# Patient Record
Sex: MALE | Race: WHITE
[De-identification: names, ages, dates, MRNs, and addresses within clinical notes are randomized per-mention and may not be internally consistent; named-entity substitution may affect disease eponyms.]

---

## 2018-01-24 ENCOUNTER — HOSPITAL ENCOUNTER (OUTPATIENT)
Dept: HOSPITAL 26 - MED | Age: 23
Setting detail: OBSERVATION
LOS: 1 days | Discharge: HOME | End: 2018-01-25
Attending: FAMILY MEDICINE | Admitting: FAMILY MEDICINE
Payer: MEDICAID

## 2018-01-24 VITALS — SYSTOLIC BLOOD PRESSURE: 148 MMHG | DIASTOLIC BLOOD PRESSURE: 96 MMHG

## 2018-01-24 VITALS — SYSTOLIC BLOOD PRESSURE: 124 MMHG | DIASTOLIC BLOOD PRESSURE: 76 MMHG

## 2018-01-24 VITALS — DIASTOLIC BLOOD PRESSURE: 72 MMHG | SYSTOLIC BLOOD PRESSURE: 130 MMHG

## 2018-01-24 VITALS — SYSTOLIC BLOOD PRESSURE: 122 MMHG | DIASTOLIC BLOOD PRESSURE: 83 MMHG

## 2018-01-24 VITALS — BODY MASS INDEX: 33.5 KG/M2 | HEIGHT: 71 IN | WEIGHT: 239.31 LBS

## 2018-01-24 VITALS — SYSTOLIC BLOOD PRESSURE: 129 MMHG | DIASTOLIC BLOOD PRESSURE: 66 MMHG

## 2018-01-24 DIAGNOSIS — I88.0: ICD-10-CM

## 2018-01-24 DIAGNOSIS — D72.829: ICD-10-CM

## 2018-01-24 DIAGNOSIS — T40.7X5A: ICD-10-CM

## 2018-01-24 DIAGNOSIS — E83.41: ICD-10-CM

## 2018-01-24 DIAGNOSIS — E78.5: ICD-10-CM

## 2018-01-24 DIAGNOSIS — F17.210: ICD-10-CM

## 2018-01-24 DIAGNOSIS — G92: ICD-10-CM

## 2018-01-24 DIAGNOSIS — M54.2: ICD-10-CM

## 2018-01-24 DIAGNOSIS — E66.9: ICD-10-CM

## 2018-01-24 DIAGNOSIS — R80.9: ICD-10-CM

## 2018-01-24 DIAGNOSIS — M54.5: Primary | ICD-10-CM

## 2018-01-24 DIAGNOSIS — T43.625A: ICD-10-CM

## 2018-01-24 LAB
ALBUMIN FLD-MCNC: 4.1 G/DL (ref 3.4–5)
AMYLASE SERPL-CCNC: 61 U/L (ref 25–115)
ANION GAP SERPL CALCULATED.3IONS-SCNC: 16.5 MMOL/L (ref 8–16)
APPEARANCE UR: CLEAR
AST SERPL-CCNC: 26 U/L (ref 15–37)
BARBITURATES UR QL SCN: (no result) NG/ML
BASOPHILS # BLD AUTO: 0.2 K/UL (ref 0–0.22)
BASOPHILS NFR BLD AUTO: 2.4 % (ref 0–2)
BENZODIAZ UR QL SCN: (no result) NG/ML
BILIRUB SERPL-MCNC: 0.5 MG/DL (ref 0–1)
BILIRUB UR QL STRIP: NEGATIVE
BUN SERPL-MCNC: 14 MG/DL (ref 7–18)
BZE UR QL SCN: (no result) NG/ML
CANNABINOIDS UR QL SCN: (no result) NG/ML
CHLORIDE SERPL-SCNC: 104 MMOL/L (ref 98–107)
CHOLEST/HDLC SERPL: 4.8 {RATIO} (ref 1–4.5)
CO2 SERPL-SCNC: 27.4 MMOL/L (ref 21–32)
COLOR UR: YELLOW
CREAT SERPL-MCNC: 1.1 MG/DL (ref 0.7–1.3)
EOSINOPHIL # BLD AUTO: 0.1 K/UL (ref 0–0.4)
EOSINOPHIL NFR BLD AUTO: 0.6 % (ref 0–4)
ERYTHROCYTE [DISTWIDTH] IN BLOOD BY AUTOMATED COUNT: 12.7 % (ref 11.6–13.7)
ERYTHROCYTE [DISTWIDTH] IN BLOOD BY AUTOMATED COUNT: 13.1 % (ref 11.6–13.7)
GFR SERPL CREATININE-BSD FRML MDRD: 110 ML/MIN (ref 90–?)
GLUCOSE SERPL-MCNC: 107 MG/DL (ref 74–106)
GLUCOSE UR STRIP-MCNC: NEGATIVE MG/DL
HCT VFR BLD AUTO: 41.4 % (ref 36–52)
HCT VFR BLD AUTO: 47.2 % (ref 36–52)
HDLC SERPL-MCNC: 51 MG/DL (ref 40–60)
HGB BLD-MCNC: 13.9 G/DL (ref 12–18)
HGB BLD-MCNC: 15.8 G/DL (ref 12–18)
HGB UR QL STRIP: NEGATIVE
LDLC SERPL CALC-MCNC: 173 MG/DL (ref 60–100)
LEUKOCYTE ESTERASE UR QL STRIP: NEGATIVE
LIPASE SERPL-CCNC: 172 U/L (ref 73–393)
LYMPHOCYTES # BLD AUTO: 2.5 K/UL (ref 2–11.5)
LYMPHOCYTES NFR BLD AUTO: 26 % (ref 20.5–51.1)
LYMPHOCYTES NFR BLD MANUAL: 15 % (ref 20–46)
MAGNESIUM SERPL-MCNC: 2.5 MG/DL (ref 1.8–2.4)
MCH RBC QN AUTO: 27 PG (ref 27–31)
MCH RBC QN AUTO: 28 PG (ref 27–31)
MCHC RBC AUTO-ENTMCNC: 33 G/DL (ref 33–37)
MCHC RBC AUTO-ENTMCNC: 34 G/DL (ref 33–37)
MCV RBC AUTO: 82 FL (ref 80–94)
MCV RBC AUTO: 82 FL (ref 80–94)
MONOCYTES # BLD AUTO: 0.7 K/UL (ref 0.8–1)
MONOCYTES NFR BLD AUTO: 6.8 % (ref 1.7–9.3)
MONOCYTES NFR BLD MANUAL: 2 % (ref 5–12)
NEUTROPHILS # BLD AUTO: 6.2 K/UL (ref 1.8–7.7)
NEUTROPHILS NFR BLD AUTO: 64.2 % (ref 42.2–75.2)
NITRITE UR QL STRIP: NEGATIVE
OPIATES UR QL SCN: (no result) NG/ML
PCP UR QL SCN: (no result) NG/ML
PH UR STRIP: 6 [PH] (ref 5–9)
PHOSPHATE SERPL-MCNC: 3.3 MG/DL (ref 2.5–4.9)
PLATELET # BLD AUTO: 247 K/UL (ref 140–450)
PLATELET # BLD AUTO: 289 K/UL (ref 140–450)
POTASSIUM SERPL-SCNC: 3.9 MMOL/L (ref 3.5–5.1)
PROTHROMBIN TIME: 10.3 SECS (ref 10.8–13.4)
RBC # BLD AUTO: 5.03 MIL/UL (ref 4.2–6.1)
RBC # BLD AUTO: 5.76 MIL/UL (ref 4.2–6.1)
RBC #/AREA URNS HPF: (no result) /HPF (ref 0–5)
SODIUM SERPL-SCNC: 144 MMOL/L (ref 136–145)
T4 FREE SERPL-MCNC: 1.14 NG/DL (ref 0.76–1.46)
TRIGL SERPL-MCNC: 107 MG/DL (ref 30–150)
TSH SERPL DL<=0.05 MIU/L-ACNC: 4.1 UIU/ML (ref 0.34–3.74)
WBC # BLD AUTO: 18.4 K/UL (ref 4.5–11)
WBC # BLD AUTO: 9.8 K/UL (ref 4.5–11)
WBC,URINE: (no result) /HPF (ref 0–5)

## 2018-01-24 PROCEDURE — 83036 HEMOGLOBIN GLYCOSYLATED A1C: CPT

## 2018-01-24 PROCEDURE — 84443 ASSAY THYROID STIM HORMONE: CPT

## 2018-01-24 PROCEDURE — 99285 EMERGENCY DEPT VISIT HI MDM: CPT

## 2018-01-24 PROCEDURE — 82150 ASSAY OF AMYLASE: CPT

## 2018-01-24 PROCEDURE — 72125 CT NECK SPINE W/O DYE: CPT

## 2018-01-24 PROCEDURE — 80053 COMPREHEN METABOLIC PANEL: CPT

## 2018-01-24 PROCEDURE — 83735 ASSAY OF MAGNESIUM: CPT

## 2018-01-24 PROCEDURE — 86900 BLOOD TYPING SEROLOGIC ABO: CPT

## 2018-01-24 PROCEDURE — 84436 ASSAY OF TOTAL THYROXINE: CPT

## 2018-01-24 PROCEDURE — 36415 COLL VENOUS BLD VENIPUNCTURE: CPT

## 2018-01-24 PROCEDURE — G0482 DRUG TEST DEF 15-21 CLASSES: HCPCS

## 2018-01-24 PROCEDURE — 93005 ELECTROCARDIOGRAM TRACING: CPT

## 2018-01-24 PROCEDURE — 96376 TX/PRO/DX INJ SAME DRUG ADON: CPT

## 2018-01-24 PROCEDURE — 70486 CT MAXILLOFACIAL W/O DYE: CPT

## 2018-01-24 PROCEDURE — 96366 THER/PROPH/DIAG IV INF ADDON: CPT

## 2018-01-24 PROCEDURE — 86886 COOMBS TEST INDIRECT TITER: CPT

## 2018-01-24 PROCEDURE — 84484 ASSAY OF TROPONIN QUANT: CPT

## 2018-01-24 PROCEDURE — 85730 THROMBOPLASTIN TIME PARTIAL: CPT

## 2018-01-24 PROCEDURE — 86901 BLOOD TYPING SEROLOGIC RH(D): CPT

## 2018-01-24 PROCEDURE — 85025 COMPLETE CBC W/AUTO DIFF WBC: CPT

## 2018-01-24 PROCEDURE — 84100 ASSAY OF PHOSPHORUS: CPT

## 2018-01-24 PROCEDURE — 82140 ASSAY OF AMMONIA: CPT

## 2018-01-24 PROCEDURE — 96367 TX/PROPH/DG ADDL SEQ IV INF: CPT

## 2018-01-24 PROCEDURE — 87081 CULTURE SCREEN ONLY: CPT

## 2018-01-24 PROCEDURE — 84439 ASSAY OF FREE THYROXINE: CPT

## 2018-01-24 PROCEDURE — 72131 CT LUMBAR SPINE W/O DYE: CPT

## 2018-01-24 PROCEDURE — 96361 HYDRATE IV INFUSION ADD-ON: CPT

## 2018-01-24 PROCEDURE — 96365 THER/PROPH/DIAG IV INF INIT: CPT

## 2018-01-24 PROCEDURE — 74176 CT ABD & PELVIS W/O CONTRAST: CPT

## 2018-01-24 PROCEDURE — 71250 CT THORAX DX C-: CPT

## 2018-01-24 PROCEDURE — 70450 CT HEAD/BRAIN W/O DYE: CPT

## 2018-01-24 PROCEDURE — 80061 LIPID PANEL: CPT

## 2018-01-24 PROCEDURE — 73564 X-RAY EXAM KNEE 4 OR MORE: CPT

## 2018-01-24 PROCEDURE — 72128 CT CHEST SPINE W/O DYE: CPT

## 2018-01-24 PROCEDURE — 85610 PROTHROMBIN TIME: CPT

## 2018-01-24 PROCEDURE — 83690 ASSAY OF LIPASE: CPT

## 2018-01-24 PROCEDURE — 81001 URINALYSIS AUTO W/SCOPE: CPT

## 2018-01-24 PROCEDURE — 84479 ASSAY OF THYROID (T3 OR T4): CPT

## 2018-01-24 PROCEDURE — 96375 TX/PRO/DX INJ NEW DRUG ADDON: CPT

## 2018-01-24 PROCEDURE — 80048 BASIC METABOLIC PNL TOTAL CA: CPT

## 2018-01-24 PROCEDURE — G0378 HOSPITAL OBSERVATION PER HR: HCPCS

## 2018-01-24 PROCEDURE — 80305 DRUG TEST PRSMV DIR OPT OBS: CPT

## 2018-01-24 PROCEDURE — 83880 ASSAY OF NATRIURETIC PEPTIDE: CPT

## 2018-01-24 PROCEDURE — 76705 ECHO EXAM OF ABDOMEN: CPT

## 2018-01-24 RX ADMIN — SODIUM CHLORIDE SCH MLS/HR: 9 INJECTION, SOLUTION INTRAVENOUS at 20:41

## 2018-01-24 RX ADMIN — METRONIDAZOLE SCH MLS/HR: 500 SOLUTION INTRAVENOUS at 13:08

## 2018-01-24 RX ADMIN — METRONIDAZOLE SCH MLS/HR: 500 SOLUTION INTRAVENOUS at 20:41

## 2018-01-24 RX ADMIN — SODIUM CHLORIDE SCH MLS/HR: 9 INJECTION, SOLUTION INTRAVENOUS at 10:06

## 2018-01-24 NOTE — NUR
PATIENT IS SLEEPING, RESPIRATION EVEN AND UNLABORED, NO S/S OF DISTRESS NOTED, CALL LIGHT 
WITHIN REACH, SAFETY MEASURE ENSURED, WILL CONTINUE TO MONITOR.

## 2018-01-24 NOTE — NUR
PT ARRIVE VIA GURNEY APPEARS ASLEEP BUT AROUSABLE. PD AT BEDSIDE AND PT IS HANDCUFFED. VITAL 
SIGNS CHECKED. T=96.3 HR 69 100% ROOMAIR /78 R 16.

## 2018-01-24 NOTE — NUR
RECEIVED REPORT FROM NIGHT NURSE AT PT BEDSIDE. PT IS FOUND ASLEEP, MINIMAL AROUSAL WITH 
MODERATE STIMULI. PD AT BEDSIDE FOR PREBOOK. PATIENT CALL LIGHT WITHIN REACH. UNABLE TO 
OBTAIN H&P. WILL CONTINUE TO MONITOR.

## 2018-01-24 NOTE — NUR
RECEIVED REPORT FROM DAY SHIFT RN, PATIENT WAS SLEEPING, EASY TO AROUSE, BUT PATIENT REFUSED 
TO TALK AND REFUSED SKIN ASSESSMENT. NO S/S OF DISTRESS NOTED, RESPIRATION EVEN AND 
UNLABORED, IV PATENT AND INTACT, INFUSING NS AT 80ML/HR, CALL LIGHT WITHIN REACH, SAFETY 
MEASURE ENSURED, WILL CONTINUE TO MONITOR.
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
## 2018-01-24 NOTE — NUR
PATIENT HAD US AT BEDSIDE. PATIENT ASLEEP, AWAKENS TO MODERATE STIMULI. NO S/S OF ACUTE 
DISTRESS. INDEPENDENT IN ADLS.

## 2018-01-24 NOTE — NUR
Patient will be admitted to care of DR MARCUM. Admited to TELE 112A.  Will 
go to room 112A. Belongings list completed.  Report to EDWIN FARAH .

## 2018-01-24 NOTE — NUR
PATIENT IS AWAKE AND ALERT AT THIS TIME. DENIES PAIN. NO S/S OF RESPIRATORY DISTRESS. 
FOLLOWS COMMANDS. ANSWERS QUESTIONS APPROPRIATELY. MADE AWARE OF HOSPITAL ENVIRONMENT. CALL 
LIGHT WITHIN REACH. IV SITE PATENT AND INTACT.

## 2018-01-24 NOTE — NUR
PATIENT'S UNCLE AT BEDSIDE. PATIENT AND FAMILY SPOKE WITH DR. GAMING AT BEDSIDE. NEW ORDERS 
RECEIVED, MEDICATED FOR PAIN AND NAUSEA. STARTED ON IVABX. NO S/S OF ACUTE DISTRESS, RESTING 
IN BED. ANSWERS QUESTIONS APPROPRIATELY. PATIENT IS NO LONGER BEING HELD FOR BOOKING.

## 2018-01-24 NOTE — NUR
PATIENT HAS BEEN SCREENED AND CATEGORIZED AS LOW NUTRITION RISK. PATIENT WILL BE SEEN WITHIN 
7 DAYS OF ADMISSION.



1/30/18



JULIETH TRACY RD

## 2018-01-25 VITALS — DIASTOLIC BLOOD PRESSURE: 73 MMHG | SYSTOLIC BLOOD PRESSURE: 115 MMHG

## 2018-01-25 LAB
ANION GAP SERPL CALCULATED.3IONS-SCNC: 12 MMOL/L (ref 8–16)
BASOPHILS # BLD AUTO: 0.4 K/UL (ref 0–0.22)
BASOPHILS NFR BLD AUTO: 4.8 % (ref 0–2)
BUN SERPL-MCNC: 11 MG/DL (ref 7–18)
CHLORIDE SERPL-SCNC: 107 MMOL/L (ref 98–107)
CO2 SERPL-SCNC: 27.9 MMOL/L (ref 21–32)
CREAT SERPL-MCNC: 0.8 MG/DL (ref 0.7–1.3)
EOSINOPHIL # BLD AUTO: 0.1 K/UL (ref 0–0.4)
EOSINOPHIL NFR BLD AUTO: 1 % (ref 0–4)
ERYTHROCYTE [DISTWIDTH] IN BLOOD BY AUTOMATED COUNT: 12.8 % (ref 11.6–13.7)
GFR SERPL CREATININE-BSD FRML MDRD: 158 ML/MIN (ref 90–?)
GLUCOSE SERPL-MCNC: 95 MG/DL (ref 74–106)
HCT VFR BLD AUTO: 41.7 % (ref 36–52)
HGB BLD-MCNC: 14 G/DL (ref 12–18)
LYMPHOCYTES # BLD AUTO: 2.4 K/UL (ref 2–11.5)
LYMPHOCYTES NFR BLD AUTO: 26.9 % (ref 20.5–51.1)
MAGNESIUM SERPL-MCNC: 1.8 MG/DL (ref 1.8–2.4)
MCH RBC QN AUTO: 28 PG (ref 27–31)
MCHC RBC AUTO-ENTMCNC: 34 G/DL (ref 33–37)
MCV RBC AUTO: 82 FL (ref 80–94)
MONOCYTES # BLD AUTO: 0.8 K/UL (ref 0.8–1)
MONOCYTES NFR BLD AUTO: 8.5 % (ref 1.7–9.3)
NEUTROPHILS # BLD AUTO: 5.4 K/UL (ref 1.8–7.7)
NEUTROPHILS NFR BLD AUTO: 58.8 % (ref 42.2–75.2)
PHOSPHATE SERPL-MCNC: 3.7 MG/DL (ref 2.5–4.9)
PLATELET # BLD AUTO: 245 K/UL (ref 140–450)
POTASSIUM SERPL-SCNC: 3.9 MMOL/L (ref 3.5–5.1)
RBC # BLD AUTO: 5.06 MIL/UL (ref 4.2–6.1)
SODIUM SERPL-SCNC: 143 MMOL/L (ref 136–145)
WBC # BLD AUTO: 9.1 K/UL (ref 4.5–11)

## 2018-01-25 RX ADMIN — LACTULOSE SCH GM: 10 SOLUTION ORAL at 09:29

## 2018-01-25 RX ADMIN — METRONIDAZOLE SCH MLS/HR: 500 SOLUTION INTRAVENOUS at 05:11

## 2018-01-25 RX ADMIN — LACTULOSE SCH GM: 10 SOLUTION ORAL at 09:00

## 2018-01-25 RX ADMIN — SODIUM CHLORIDE SCH MLS/HR: 9 INJECTION, SOLUTION INTRAVENOUS at 09:30

## 2018-01-25 NOTE — NUR
NO CHANGE IN CONDITION, PATIENT IS SLEEPING, NO S/S OF DISTRESS NOTED, RESPIRATION EVEN AND 
UNLABORED, CALL LIGHT WITHIN REACH, SAFETY MEASURE ENSURED, WILL CONTINUE TO MONITOR.

## 2018-01-25 NOTE — NUR
PATIENT IS SLEEPING COMFORTABLY. RESPIRATION EVEN, UNLABOR ON ROOM AIR. NO DISTRESS NOTED AT 
THIS TIME. IV PATENT AND INTACT. CALL LIGHT WITHIN REACH

## 2018-01-25 NOTE — NUR
PATIENT C/O IV PUMP BEEPING, FIXED THE IV PUMP, PATIENT IS SLEEPING AT THIS TIME, NO S/S OF 
DISTRESS NOTED, WILL CONTINUE TO MONITOR.

## 2018-01-25 NOTE — NUR
ENDORSEMENT RECEIVED FROM NIGHT SHIFT NURSE. PATIENT IS SLEEPING COMFORTABLY. RESPIRATION 
EVEN, UNLABOR ON ROOM AIR. SKIN DRY AND WARM. IV PATENT AND INTACT. CALL LIGHT WITHIN REACH. 
NO DISTRESS NOTED AT THIS TIME. BED AT LOW POSITION

## 2018-01-25 NOTE — NUR
PATIENT IS SLEEPING, EASY TO AROUSE, VITAL SIGNS STABLE, RESPIRATION EVEN AND UNLABORED, NO 
S/S OF DISTRESS NOTED, CALL LIGHT WITHIN REACH, SAFETY MEASURE ENSURED, WILL CONTINUE TO 
MONITOR.
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
## 2018-01-28 LAB
T3RU NFR SERPL: 30 % (ref 24–39)
T4 SERPL-MCNC: 8.3 UG/DL (ref 4.5–12)